# Patient Record
Sex: MALE | Race: BLACK OR AFRICAN AMERICAN | NOT HISPANIC OR LATINO | Employment: UNEMPLOYED | URBAN - METROPOLITAN AREA
[De-identification: names, ages, dates, MRNs, and addresses within clinical notes are randomized per-mention and may not be internally consistent; named-entity substitution may affect disease eponyms.]

---

## 2019-05-03 ENCOUNTER — APPOINTMENT (EMERGENCY)
Dept: RADIOLOGY | Facility: HOSPITAL | Age: 19
End: 2019-05-03
Payer: COMMERCIAL

## 2019-05-03 ENCOUNTER — HOSPITAL ENCOUNTER (EMERGENCY)
Facility: HOSPITAL | Age: 19
Discharge: HOME | End: 2019-05-03
Attending: EMERGENCY MEDICINE | Admitting: EMERGENCY MEDICINE
Payer: COMMERCIAL

## 2019-05-03 VITALS
OXYGEN SATURATION: 98 % | SYSTOLIC BLOOD PRESSURE: 148 MMHG | WEIGHT: 219 LBS | HEART RATE: 102 BPM | DIASTOLIC BLOOD PRESSURE: 97 MMHG | TEMPERATURE: 99.1 F | RESPIRATION RATE: 18 BRPM

## 2019-05-03 DIAGNOSIS — S71.111A LACERATION OF RIGHT THIGH, INITIAL ENCOUNTER: Primary | ICD-10-CM

## 2019-05-03 PROCEDURE — 99283 EMERGENCY DEPT VISIT LOW MDM: CPT

## 2019-05-03 PROCEDURE — 99283 EMERGENCY DEPT VISIT LOW MDM: CPT | Performed by: EMERGENCY MEDICINE

## 2019-05-03 PROCEDURE — 73552 X-RAY EXAM OF FEMUR 2/>: CPT

## 2019-05-03 PROCEDURE — 13121 CMPLX RPR S/A/L 2.6-7.5 CM: CPT | Performed by: EMERGENCY MEDICINE

## 2019-05-03 RX ORDER — LIDOCAINE HYDROCHLORIDE AND EPINEPHRINE 20; 5 MG/ML; UG/ML
10 INJECTION, SOLUTION EPIDURAL; INFILTRATION; INTRACAUDAL; PERINEURAL ONCE
Status: COMPLETED | OUTPATIENT
Start: 2019-05-03 | End: 2019-05-03

## 2019-05-03 RX ORDER — GINSENG 100 MG
1 CAPSULE ORAL ONCE
Status: COMPLETED | OUTPATIENT
Start: 2019-05-03 | End: 2019-05-03

## 2019-05-03 RX ADMIN — LIDOCAINE HYDROCHLORIDE,EPINEPHRINE BITARTRATE 10 ML: 20; .005 INJECTION, SOLUTION EPIDURAL; INFILTRATION; INTRACAUDAL; PERINEURAL at 01:30

## 2019-05-03 RX ADMIN — BACITRACIN ZINC 1 SMALL APPLICATION: 500 OINTMENT TOPICAL at 02:25

## 2019-07-31 ENCOUNTER — APPOINTMENT (EMERGENCY)
Dept: RADIOLOGY | Facility: HOSPITAL | Age: 19
End: 2019-07-31
Payer: COMMERCIAL

## 2019-07-31 ENCOUNTER — HOSPITAL ENCOUNTER (EMERGENCY)
Facility: HOSPITAL | Age: 19
Discharge: HOME | End: 2019-07-31
Attending: EMERGENCY MEDICINE | Admitting: EMERGENCY MEDICINE
Payer: COMMERCIAL

## 2019-07-31 VITALS
DIASTOLIC BLOOD PRESSURE: 89 MMHG | OXYGEN SATURATION: 97 % | SYSTOLIC BLOOD PRESSURE: 150 MMHG | RESPIRATION RATE: 14 BRPM | HEART RATE: 80 BPM | WEIGHT: 214 LBS | TEMPERATURE: 96.8 F

## 2019-07-31 DIAGNOSIS — S43.014A ANTERIOR SHOULDER DISLOCATION, RIGHT, INITIAL ENCOUNTER: Primary | ICD-10-CM

## 2019-07-31 PROCEDURE — 99284 EMERGENCY DEPT VISIT MOD MDM: CPT

## 2019-07-31 PROCEDURE — 23655 CLTX SHO DSLC W/MNPJ W/ANES: CPT | Performed by: EMERGENCY MEDICINE

## 2019-07-31 PROCEDURE — 73030 X-RAY EXAM OF SHOULDER: CPT

## 2019-07-31 PROCEDURE — 99283 EMERGENCY DEPT VISIT LOW MDM: CPT | Performed by: EMERGENCY MEDICINE

## 2019-07-31 PROCEDURE — 96372 THER/PROPH/DIAG INJ SC/IM: CPT

## 2019-07-31 PROCEDURE — 73020 X-RAY EXAM OF SHOULDER: CPT

## 2019-07-31 RX ORDER — PROPOFOL 10 MG/ML
INJECTION, EMULSION INTRAVENOUS
Status: COMPLETED
Start: 2019-07-31 | End: 2019-07-31

## 2019-07-31 RX ORDER — PROPOFOL 10 MG/ML
100 INJECTION, EMULSION INTRAVENOUS ONCE
Status: COMPLETED | OUTPATIENT
Start: 2019-07-31 | End: 2019-07-31

## 2019-07-31 RX ORDER — MIDAZOLAM HYDROCHLORIDE 1 MG/ML
INJECTION INTRAMUSCULAR; INTRAVENOUS
Status: COMPLETED
Start: 2019-07-31 | End: 2019-07-31

## 2019-07-31 RX ORDER — HYDROCODONE BITARTRATE AND IBUPROFEN 7.5; 2 MG/1; MG/1
1 TABLET, FILM COATED ORAL EVERY 6 HOURS PRN
Qty: 10 TABLET | Refills: 0 | Status: SHIPPED | OUTPATIENT
Start: 2019-07-31 | End: 2019-08-10

## 2019-07-31 RX ADMIN — PROPOFOL 100 MG: 10 INJECTION, EMULSION INTRAVENOUS at 05:15

## 2019-07-31 RX ADMIN — MORPHINE SULFATE 2 MG: 2 INJECTION, SOLUTION INTRAMUSCULAR; INTRAVENOUS at 05:18

## 2019-07-31 RX ADMIN — MIDAZOLAM 2 MG: 1 INJECTION INTRAMUSCULAR; INTRAVENOUS at 05:17

## 2019-07-31 RX ADMIN — MORPHINE SULFATE 2 MG: 2 INJECTION, SOLUTION INTRAMUSCULAR; INTRAVENOUS at 05:12

## 2019-07-31 NOTE — ED PROVIDER NOTES
History  Chief Complaint   Patient presents with    Shoulder Pain     right     23 y/o AAM c/o R shoulder pain with possible dislocation  Patient states that he woke up from sleep and attempted to stretch - felt a "pop" and pain ensued  Unable to adduct and internally rotate  History of prior dislocations  No meds taken PTA  Incident occurred around 3:30 per patient  None       History reviewed  No pertinent past medical history  History reviewed  No pertinent surgical history  History reviewed  No pertinent family history  I have reviewed and agree with the history as documented  Social History     Tobacco Use    Smoking status: Current Some Day Smoker    Smokeless tobacco: Never Used   Substance Use Topics    Alcohol use: Never     Frequency: Never    Drug use: Never        Review of Systems   Musculoskeletal: Positive for arthralgias  All other systems reviewed and are negative  Physical Exam  Physical Exam   Constitutional: He is oriented to person, place, and time  He appears well-developed and well-nourished  HENT:   Head: Normocephalic and atraumatic  Nose: Nose normal    Eyes: Pupils are equal, round, and reactive to light  Conjunctivae and EOM are normal    Neck: Normal range of motion  Neck supple  Cardiovascular: Normal rate, regular rhythm and normal heart sounds  Pulmonary/Chest: Effort normal and breath sounds normal    Abdominal: Soft  Bowel sounds are normal    Musculoskeletal: He exhibits tenderness and deformity  Right shoulder: He exhibits decreased range of motion, tenderness, bony tenderness, deformity, pain and decreased strength  He exhibits no swelling, no effusion, no crepitus, no laceration, no spasm and normal pulse  Arms:  Neurological: He is alert and oriented to person, place, and time  Skin: Skin is warm and dry  Capillary refill takes less than 2 seconds  Psychiatric: He has a normal mood and affect     Vitals reviewed  Vital Signs  ED Triage Vitals   Temperature Pulse Respirations Blood Pressure SpO2   07/31/19 0420 07/31/19 0420 07/31/19 0420 07/31/19 0420 07/31/19 0420   (!) 96 8 °F (36 °C) 61 18 (!) 166/110 100 %      Temp src Heart Rate Source Patient Position - Orthostatic VS BP Location FiO2 (%)   -- 07/31/19 0420 07/31/19 0420 07/31/19 0420 --    Monitor Sitting Left arm       Pain Score       07/31/19 0434       Worst Possible Pain           Vitals:    07/31/19 0545 07/31/19 0600 07/31/19 0615 07/31/19 0634   BP: 144/88 148/95 149/86 150/89   Pulse: 70 63 60 80   Patient Position - Orthostatic VS: Lying Lying Lying Lying         Visual Acuity      ED Medications  Medications   propofol (DIPRIVAN) 200 MG/20ML bolus injection 100 mg (100 mg Intravenous Given 7/31/19 0515)   morphine injection 2 mg (2 mg Intramuscular Given 7/31/19 0512)   morphine 2 mg/mL injection **ADS Override Pull** (2 mg  Given 7/31/19 0518)   midazolam (VERSED) 2 mg/2 mL injection **ADS Override Pull** (2 mg  Given 7/31/19 0517)       Diagnostic Studies  Results Reviewed     None                 XR shoulder 1 vw RIGHT POST REDUCTION   Final Result by Nicole Chavira DO (07/31 1730)   FINDINGS/IMPRESSION:   Interval reduction in the previously seen anterior dislocation of the right glenohumeral joint  Alignment of the joint is now anatomic  No fracture is seen  No suspicious appearing osseous lesions  No other significant interval change  Workstation performed: [id number]         XR shoulder 2+ views RIGHT   ED Interpretation by Lakeisha Holt DO (07/31 0440)   R anterior humeral head dislocation  Final Result by Nicole Chavira DO (07/31 0601)   FINDINGS/IMPRESSION:      There is anterior dislocation of the right glenohumeral joint  The bones appear intact  The acromioclavicular joint appears maintained  No significant degenerative changes  No suspicious appearing osseous lesions are seen  The visualized soft tissues appear grossly unremarkable  Workstation performed: [id number]                    Procedures  Orthopedic injury treatment  Date/Time: 7/31/2019 4:56 AM  Performed by: Francisco Javier Kulkarni, DO  Authorized by: Francisco Javier Kulkarni, DO     Patient Location:  ED  Other Assisting Provider: No    Written consent obtained?: Yes    Risks and benefits: Risks, benefits and alternatives were discussed    Consent given by:  Patient  Injury location:  Shoulder  Location details:  Right shoulder  Injury type:  Dislocation  Dislocation type: anterior    Chronicity:  Recurrent  Hill-Sachs deformity?: No    Neurovascular status: Neurovascularly intact    Distal perfusion: normal    Neurological function: normal    Range of motion: reduced    Local anesthesia used?: No    General anesthesia used?: No    Sedation type: Moderate (conscious) sedation (See separate Procedural Sedation form)  Manipulation performed?: No    Immobilization:  Sling  Neurovascular status: Neurovascularly intact    Distal perfusion: normal    Neurological function: normal    Range of motion: improved    Patient tolerance:  Patient tolerated the procedure well with no immediate complications           ED Course   Traction/counter-traction technique utilized to reduce R humeral head dislocation  MDM    Disposition  Final diagnoses:   Anterior shoulder dislocation, right, initial encounter     Time reflects when diagnosis was documented in both MDM as applicable and the Disposition within this note     Time User Action Codes Description Comment    7/31/2019  6:09 AM Sophie Dill Add [[id number]] Anterior shoulder dislocation, right, initial encounter       ED Disposition     ED Disposition Condition Date/Time Comment    Discharge Stable Wed Jul 31, 2019  6:51 AM Rebeca Crew discharge to home/self care              Follow-up Information     Follow up With Specialties Details Why Contact Info Your PCP or Orthopaedic surgeon  Schedule an appointment as soon as possible for a visit in 1 week            Patient's Medications   Discharge Prescriptions    HYDROCODONE-IBUPROFEN (VICOPROFEN) 7 5-200 MG PER TABLET    Take 1 tablet by mouth every 6 (six) hours as needed for moderate pain for up to 10 daysMax Daily Amount: 4 tablets       Start Date: 7/31/2019 End Date: 8/10/2019       Order Dose: 1 tablet       Quantity: 10 tablet    Refills: 0     No discharge procedures on file      ED Provider  Electronically Signed by           Vijaya Styels,   07/31/19 Jamil 33, DO  07/31/19 Jamil 33, DO  07/31/19 5135

## 2019-08-20 ENCOUNTER — APPOINTMENT (EMERGENCY)
Dept: RADIOLOGY | Facility: HOSPITAL | Age: 19
End: 2019-08-20
Payer: COMMERCIAL

## 2019-08-20 ENCOUNTER — HOSPITAL ENCOUNTER (EMERGENCY)
Facility: HOSPITAL | Age: 19
Discharge: HOME | End: 2019-08-20
Attending: EMERGENCY MEDICINE | Admitting: EMERGENCY MEDICINE
Payer: COMMERCIAL

## 2019-08-20 VITALS
WEIGHT: 215.61 LBS | OXYGEN SATURATION: 98 % | DIASTOLIC BLOOD PRESSURE: 91 MMHG | SYSTOLIC BLOOD PRESSURE: 173 MMHG | RESPIRATION RATE: 16 BRPM | TEMPERATURE: 97.8 F | HEART RATE: 87 BPM

## 2019-08-20 DIAGNOSIS — S43.004A DISLOCATION OF RIGHT SHOULDER JOINT, INITIAL ENCOUNTER: Primary | ICD-10-CM

## 2019-08-20 PROCEDURE — 73020 X-RAY EXAM OF SHOULDER: CPT

## 2019-08-20 PROCEDURE — 99284 EMERGENCY DEPT VISIT MOD MDM: CPT

## 2019-08-20 PROCEDURE — 96374 THER/PROPH/DIAG INJ IV PUSH: CPT

## 2019-08-20 PROCEDURE — 73030 X-RAY EXAM OF SHOULDER: CPT

## 2019-08-20 PROCEDURE — 99285 EMERGENCY DEPT VISIT HI MDM: CPT | Performed by: EMERGENCY MEDICINE

## 2019-08-20 PROCEDURE — 96376 TX/PRO/DX INJ SAME DRUG ADON: CPT

## 2019-08-20 PROCEDURE — 23650 CLTX SHO DSLC W/MNPJ WO ANES: CPT | Performed by: EMERGENCY MEDICINE

## 2019-08-20 PROCEDURE — 96375 TX/PRO/DX INJ NEW DRUG ADDON: CPT

## 2019-08-20 RX ORDER — FENTANYL CITRATE 50 UG/ML
100 INJECTION, SOLUTION INTRAMUSCULAR; INTRAVENOUS ONCE
Status: COMPLETED | OUTPATIENT
Start: 2019-08-20 | End: 2019-08-20

## 2019-08-20 RX ORDER — IBUPROFEN 800 MG/1
800 TABLET ORAL 3 TIMES DAILY
Qty: 21 TABLET | Refills: 0 | Status: SHIPPED | OUTPATIENT
Start: 2019-08-20

## 2019-08-20 RX ORDER — KETOROLAC TROMETHAMINE 30 MG/ML
30 INJECTION, SOLUTION INTRAMUSCULAR; INTRAVENOUS ONCE
Status: COMPLETED | OUTPATIENT
Start: 2019-08-20 | End: 2019-08-20

## 2019-08-20 RX ADMIN — FENTANYL CITRATE 100 MCG: 50 INJECTION INTRAMUSCULAR; INTRAVENOUS at 12:19

## 2019-08-20 RX ADMIN — FENTANYL CITRATE 100 MCG: 50 INJECTION INTRAMUSCULAR; INTRAVENOUS at 11:46

## 2019-08-20 RX ADMIN — KETOROLAC TROMETHAMINE 30 MG: 30 INJECTION, SOLUTION INTRAMUSCULAR at 11:45

## 2019-08-20 NOTE — ED NOTES
Assisted Dr Atif Matthews with manipulation of right shoulder, sling applied post-reduction    See provider charting     Moon Vidal RN  08/20/19 4875

## 2020-03-12 ENCOUNTER — APPOINTMENT (EMERGENCY)
Dept: RADIOLOGY | Facility: HOSPITAL | Age: 20
End: 2020-03-12
Payer: COMMERCIAL

## 2020-03-12 ENCOUNTER — HOSPITAL ENCOUNTER (EMERGENCY)
Facility: HOSPITAL | Age: 20
Discharge: HOME | End: 2020-03-12
Attending: EMERGENCY MEDICINE | Admitting: EMERGENCY MEDICINE
Payer: COMMERCIAL

## 2020-03-12 VITALS
DIASTOLIC BLOOD PRESSURE: 86 MMHG | OXYGEN SATURATION: 99 % | WEIGHT: 210 LBS | RESPIRATION RATE: 16 BRPM | HEART RATE: 106 BPM | TEMPERATURE: 96.4 F | SYSTOLIC BLOOD PRESSURE: 155 MMHG

## 2020-03-12 DIAGNOSIS — S41.112A LACERATION OF LEFT UPPER EXTREMITY, INITIAL ENCOUNTER: Primary | ICD-10-CM

## 2020-03-12 PROCEDURE — 12002 RPR S/N/AX/GEN/TRNK2.6-7.5CM: CPT | Performed by: EMERGENCY MEDICINE

## 2020-03-12 PROCEDURE — 99283 EMERGENCY DEPT VISIT LOW MDM: CPT

## 2020-03-12 PROCEDURE — 99284 EMERGENCY DEPT VISIT MOD MDM: CPT | Performed by: EMERGENCY MEDICINE

## 2020-03-12 PROCEDURE — 73080 X-RAY EXAM OF ELBOW: CPT

## 2020-03-12 PROCEDURE — 73090 X-RAY EXAM OF FOREARM: CPT

## 2020-03-12 RX ORDER — GINSENG 100 MG
1 CAPSULE ORAL ONCE
Status: COMPLETED | OUTPATIENT
Start: 2020-03-12 | End: 2020-03-12

## 2020-03-12 RX ORDER — LIDOCAINE HYDROCHLORIDE AND EPINEPHRINE 10; 10 MG/ML; UG/ML
10 INJECTION, SOLUTION INFILTRATION; PERINEURAL ONCE
Status: COMPLETED | OUTPATIENT
Start: 2020-03-12 | End: 2020-03-12

## 2020-03-12 RX ADMIN — LIDOCAINE HYDROCHLORIDE AND EPINEPHRINE 10 ML: 10; 10 INJECTION, SOLUTION INFILTRATION; PERINEURAL at 05:13

## 2020-03-12 RX ADMIN — BACITRACIN ZINC 1 SMALL APPLICATION: 500 OINTMENT TOPICAL at 06:14

## 2020-03-12 NOTE — ED PROVIDER NOTES
History  Chief Complaint   Patient presents with    Extremity Laceration     Laceration to left forearm, patient state's he cut it on the edge of a night stand a metal peice  21 y/o male c/o LUE laceration s/p fall on elbow - cut on metal edge of nightstand table  Tetanus UTD  No gross deformity  FROM  Bleeding controlled with pressure  Prior to Admission Medications   Prescriptions Last Dose Informant Patient Reported? Taking?   ibuprofen (MOTRIN) 800 mg tablet   No No   Sig: Take 1 tablet (800 mg total) by mouth 3 (three) times a day      Facility-Administered Medications: None       Past Medical History:   Diagnosis Date    Dislocation closed, shoulder     started at age 14, right shoulder       History reviewed  No pertinent surgical history  History reviewed  No pertinent family history  I have reviewed and agree with the history as documented  E-Cigarette/Vaping     E-Cigarette/Vaping Substances     Social History     Tobacco Use    Smoking status: Current Some Day Smoker    Smokeless tobacco: Never Used   Substance Use Topics    Alcohol use: Never     Frequency: Never    Drug use: Never       Review of Systems   Skin: Positive for wound  All other systems reviewed and are negative  Physical Exam  Physical Exam   Constitutional: He is oriented to person, place, and time  He appears well-developed and well-nourished  HENT:   Head: Normocephalic and atraumatic  Eyes: Pupils are equal, round, and reactive to light  Conjunctivae and EOM are normal    Neck: Normal range of motion  Neck supple  Cardiovascular: Normal rate and regular rhythm  Pulmonary/Chest: Effort normal and breath sounds normal    Abdominal: Soft  Musculoskeletal: Normal range of motion  He exhibits no tenderness  Neurological: He is alert and oriented to person, place, and time  Skin: Skin is warm  Capillary refill takes less than 2 seconds  Laceration noted          LUE laceration 6 cm in total length - linear   Psychiatric: He has a normal mood and affect  Vitals reviewed  Vital Signs  ED Triage Vitals [03/12/20 0502]   Temperature Pulse Respirations Blood Pressure SpO2   (!) 96 4 °F (35 8 °C) (!) 106 16 155/86 99 %      Temp Source Heart Rate Source Patient Position - Orthostatic VS BP Location FiO2 (%)   Tympanic Monitor Lying Left arm --      Pain Score       --           Vitals:    03/12/20 0502   BP: 155/86   Pulse: (!) 106   Patient Position - Orthostatic VS: Lying         Visual Acuity      ED Medications  Medications   bacitracin topical ointment 1 small application (has no administration in time range)   lidocaine-epinephrine (XYLOCAINE/EPINEPHRINE) 1 %-1:100,000 injection 10 mL (10 mL Infiltration Given 3/12/20 0569)       Diagnostic Studies  Results Reviewed     None                 XR elbow 3+ views LEFT   ED Interpretation by Dane Walton DO (03/12 0010)   Normal 3V L elbow  Final Result by Suze Gibson DO (03/12 4000)      Soft tissue tissue swelling as described but no acute osseous abnormality is seen  No discrete radiopaque foreign body is seen  Workstation performed: [id number]         XR forearm 2 views LEFT   Final Result by Suze Gibson DO (03/12 4213)      Soft tissue tissue swelling as described but no acute osseous abnormality is seen  No discrete radiopaque foreign body is seen        Workstation performed: [id number]                    Procedures  Procedures         ED Course                                 MDM      Disposition  Final diagnoses:   Laceration of left upper extremity, initial encounter     Time reflects when diagnosis was documented in both MDM as applicable and the Disposition within this note     Time User Action Codes Description Comment    3/12/2020  6:08 AM Tirso Valadez Add [[id number]] Laceration of left upper extremity, initial encounter       ED Disposition     ED Disposition Condition Date/Time Comment Discharge Stable Thu Mar 12, 2020  6:07 AM Miguel A Fournier discharge to home/self care  Follow-up Information     Follow up With Specialties Details Why Contact Info    Lisa Eaton DO Family Medicine Schedule an appointment as soon as possible for a visit in 1 week For suture removal, For wound re-check, 7-10 days [address]  [phone]            Patient's Medications   Discharge Prescriptions    No medications on file     No discharge procedures on file      PDMP Review     None          ED Provider  Electronically Signed by           Yamile Marrero DO  03/12/20 8711

## 2022-02-25 ENCOUNTER — HOSPITAL ENCOUNTER (EMERGENCY)
Facility: HOSPITAL | Age: 22
Discharge: HOME | End: 2022-02-25
Attending: EMERGENCY MEDICINE | Admitting: EMERGENCY MEDICINE
Payer: COMMERCIAL

## 2022-02-25 ENCOUNTER — APPOINTMENT (EMERGENCY)
Dept: RADIOLOGY | Facility: HOSPITAL | Age: 22
End: 2022-02-25
Payer: COMMERCIAL

## 2022-02-25 VITALS
WEIGHT: 235.89 LBS | OXYGEN SATURATION: 100 % | HEART RATE: 73 BPM | DIASTOLIC BLOOD PRESSURE: 71 MMHG | RESPIRATION RATE: 18 BRPM | TEMPERATURE: 98.6 F | SYSTOLIC BLOOD PRESSURE: 120 MMHG

## 2022-02-25 DIAGNOSIS — M24.411 SHOULDER DISLOCATION, RECURRENT, RIGHT: Primary | ICD-10-CM

## 2022-02-25 PROCEDURE — 73020 X-RAY EXAM OF SHOULDER: CPT

## 2022-02-25 PROCEDURE — 23650 CLTX SHO DSLC W/MNPJ WO ANES: CPT | Performed by: EMERGENCY MEDICINE

## 2022-02-25 PROCEDURE — 99152 MOD SED SAME PHYS/QHP 5/>YRS: CPT | Performed by: EMERGENCY MEDICINE

## 2022-02-25 PROCEDURE — 99284 EMERGENCY DEPT VISIT MOD MDM: CPT | Performed by: EMERGENCY MEDICINE

## 2022-02-25 PROCEDURE — 99283 EMERGENCY DEPT VISIT LOW MDM: CPT

## 2022-02-25 PROCEDURE — 73030 X-RAY EXAM OF SHOULDER: CPT

## 2022-02-25 RX ORDER — PROPOFOL 10 MG/ML
100 INJECTION, EMULSION INTRAVENOUS ONCE
Status: COMPLETED | OUTPATIENT
Start: 2022-02-25 | End: 2022-02-25

## 2022-02-25 RX ORDER — OXYCODONE HYDROCHLORIDE AND ACETAMINOPHEN 5; 325 MG/1; MG/1
1 TABLET ORAL EVERY 6 HOURS PRN
Qty: 16 TABLET | Refills: 0 | Status: SHIPPED | OUTPATIENT
Start: 2022-02-25 | End: 2022-03-07

## 2022-02-25 RX ADMIN — PROPOFOL 100 MG: 10 INJECTION, EMULSION INTRAVENOUS at 17:16

## 2022-02-25 NOTE — ED PROCEDURE NOTE
PROCEDURE  Orthopedic injury treatment    Date/Time: 2/25/2022 5:29 PM  Performed by: Kitty Christianson MD  Authorized by: Kitty Christianson MD     Patient Location:  ED  Other Assisting Provider: Yes (comment)    Written consent obtained?: Yes    Risks and benefits: Risks, benefits and alternatives were discussed    Consent given by:  Patient  Patient states understanding of procedure being performed: Yes    Radiology Images displayed and confirmed  If images not available, report reviewed: Yes    Time out: Immediately prior to the procedure a time out was called    Injury location:  Shoulder  Injury type:  Dislocation  Dislocation type: anterior    Chronicity:  Recurrent  Hill-Sachs deformity?: No    Neurovascular status: Neurovascularly intact    Distal perfusion: normal    Neurological function: normal    Range of motion: reduced    Local anesthesia used?: No    General anesthesia used?: Yes    Sedation type:   Moderate (conscious) sedation (See separate Procedural Sedation form)  Manipulation performed?: Yes    Reduction method:  Traction and counter traction  Reduction method:  Traction and counter traction  Reduction method:  Traction and counter traction  Reduction method:  Traction and counter traction  Reduction method:  Traction and counter traction  Reduction method:  Traction and counter traction  Skeletal traction used?: No    Reduction successful?: Yes    Confirmation: Reduction confirmed by x-ray    Immobilization:  Sling  Neurovascular status: Neurovascularly intact    Distal perfusion: normal    Neurological function: normal    Range of motion: improved    Patient tolerance:  Patient tolerated the procedure well with no immediate complications         Kitty Christianson MD  02/25/22 7988

## 2022-02-25 NOTE — ED PROCEDURE NOTE
PROCEDURE  Pre-Procedural Sedation  Performed by: Rocky Park MD  Authorized by: Rocky Park MD     Consent:     Consent obtained:  Written    Consent given by:  Patient    Risks discussed:  Inadequate sedation, nausea, vomiting, respiratory compromise necessitating ventilatory assistance and intubation and prolonged hypoxia resulting in organ damage    Alternatives discussed:  Analgesia without sedation  Universal protocol:     Procedure explained and questions answered to patient or proxy's satisfaction: yes      Radiology Images displayed and confirmed    If images not available, report reviewed: yes      Site/side marked: yes      Immediately prior to procedure a time out was called: yes      Patient identity confirmation method:  Verbally with patient and arm band  Indications:     Sedation purpose:  Dislocation reduction    Procedure necessitating sedation performed by:  Physician performing sedation    Intended level of sedation:  Moderate (conscious sedation)  Pre-sedation assessment:     Time since last food or drink:  12 PM    ASA classification: class 1 - normal, healthy patient      Neck mobility: normal      Mouth opening:  3 or more finger widths    Thyromental distance:  2 finger widths    Mallampati score:  I - soft palate, uvula, fauces, pillars visible    Pre-sedation assessments completed and reviewed: airway patency      History of difficult intubation: no      Pre-sedation assessment completed:  2/25/2022 5:12 PM         Rocky Park MD  02/25/22 0249

## 2022-02-25 NOTE — ED PROVIDER NOTES
History  Chief Complaint   Patient presents with    Shoulder Pain     pt reports " throwing child above head and dislocating right shoulder, it's happened before, I guess I need surgery "     Patient is a 68-year-old right hand dominant male who presents with acute onset of sharp nonradiating shoulder pain with limited ROM  Similar to previous dislocations x 4  Was just tossing his child in the air  No direct trauma  No distal numbness/weakness  Last meal at 12 pm   No meds taken  Worse with any attempted movement  Prior to Admission Medications   Prescriptions Last Dose Informant Patient Reported? Taking?   ibuprofen (MOTRIN) 800 mg tablet Not Taking at Unknown time  No No   Sig: Take 1 tablet (800 mg total) by mouth 3 (three) times a day   Patient not taking: Reported on 2/25/2022       Facility-Administered Medications: None       Past Medical History:   Diagnosis Date    Dislocation closed, shoulder     started at age 14, right shoulder       History reviewed  No pertinent surgical history  History reviewed  No pertinent family history  I have reviewed and agree with the history as documented  E-Cigarette/Vaping     E-Cigarette/Vaping Substances     Social History     Tobacco Use    Smoking status: Current Some Day Smoker    Smokeless tobacco: Never Used   Substance Use Topics    Alcohol use: Yes    Drug use: Never       Review of Systems   Constitutional: Negative  HENT: Negative  Eyes: Negative  Respiratory: Negative  Cardiovascular: Negative  Gastrointestinal: Negative  Endocrine: Negative  Genitourinary: Negative  Musculoskeletal: Positive for arthralgias  Skin: Negative  Allergic/Immunologic: Negative  Neurological: Negative  Hematological: Negative  Psychiatric/Behavioral: Negative  All other systems reviewed and are negative  Physical Exam  Physical Exam  Vitals and nursing note reviewed     Constitutional:       Appearance: Normal appearance  He is obese  HENT:      Head: Normocephalic and atraumatic  Cardiovascular:      Rate and Rhythm: Normal rate and regular rhythm  Pulses: Normal pulses  Heart sounds: Normal heart sounds  Pulmonary:      Effort: Pulmonary effort is normal       Breath sounds: Normal breath sounds  Abdominal:      General: Bowel sounds are normal    Musculoskeletal:         General: Deformity present  Cervical back: Normal range of motion and neck supple  Comments: Deformity of lateral right shoulder c/w dislocation  No clavicular or elbow pain  Skin:     General: Skin is warm and dry  Capillary Refill: Capillary refill takes less than 2 seconds  Neurological:      General: No focal deficit present  Mental Status: He is alert and oriented to person, place, and time  Psychiatric:         Mood and Affect: Mood normal          Behavior: Behavior normal          Vital Signs  ED Triage Vitals   Temperature Pulse Respirations Blood Pressure SpO2   02/25/22 1651 02/25/22 1651 02/25/22 1651 02/25/22 1651 02/25/22 1651   98 6 °F (37 °C) 83 18 (!) 151/115 99 %      Temp Source Heart Rate Source Patient Position - Orthostatic VS BP Location FiO2 (%)   02/25/22 1651 02/25/22 1651 02/25/22 1651 02/25/22 1651 --   Tympanic Monitor Sitting Left arm       Pain Score       02/25/22 1728       2           Vitals:    02/25/22 1723 02/25/22 1723 02/25/22 1728 02/25/22 1800   BP: (!) 169/101 (!) 169/101 118/75 120/71   Pulse: 68 68 62 73   Patient Position - Orthostatic VS:  Sitting Lying Sitting         Visual Acuity      ED Medications  Medications   propofol (DIPRIVAN) 200 MG/20ML bolus injection 100 mg (100 mg Intravenous Given by Other 2/25/22 1716)       Diagnostic Studies  Results Reviewed     None                 XR shoulder 1 vw RIGHT POST REDUCTION   Final Result by Edy Madrid MD (02/26 6757)      Interval reduction of a right anterior shoulder dislocation  Workstation performed: [id number]         XR shoulder 2+ views RIGHT   Final Result by Shannan Gabriel MD (02/26 0411)      Right anterior shoulder dislocation  Findings concur with ED results as provided in PACS viewer/EPIC at the time of interpretation  Workstation performed: [id number]                    Procedures  Procedures         ED Course  ED Course as of 02/27/22 1523   Fri Feb 25, 2022   1807 Patient using smarthphone with right hand  No numbness/weakness  Hand strength 5/5  Will dc  Advised ortho follow up  SBIRT 20yo+      Most Recent Value   SBIRT (24 yo +)    In order to provide better care to our patients, we are screening all of our patients for alcohol and drug use  Would it be okay to ask you these screening questions? No Filed at: 02/25/2022 1700                    MDM  Number of Diagnoses or Management Options     Amount and/or Complexity of Data Reviewed  Tests in the radiology section of CPT®: ordered and reviewed  Independent visualization of images, tracings, or specimens: yes        Disposition  Final diagnoses:   Shoulder dislocation, recurrent, right     Time reflects when diagnosis was documented in both MDM as applicable and the Disposition within this note     Time User Action Codes Description Comment    2/25/2022  5:34 PM Yelena Kee Add [[id number]] Shoulder dislocation, recurrent, right       ED Disposition     ED Disposition Condition Date/Time Comment    Discharge Stable Fri Feb 25, 2022  5:59 PM Giuseppe Quispe discharge to home/self care              Follow-up Information     Follow up With Specialties Details Why Contact Info Additional Information    [address] [facility]   [address] [id number]  [address] [place] [address] [facility], [address], [place], [id number] [place] [address] Ln Discharge Medication List as of 2/25/2022  5:59 PM      START taking these medications    Details   oxyCODONE-acetaminophen (Percocet) 5-325 mg per tablet Take 1 tablet by mouth every 6 (six) hours as needed for moderate pain or severe pain for up to 10 days Max Daily Amount: 4 tablets, Starting Fri 2/25/2022, Until Mon 3/7/2022 at 2359, Normal         CONTINUE these medications which have NOT CHANGED    Details   ibuprofen (MOTRIN) 800 mg tablet Take 1 tablet (800 mg total) by mouth 3 (three) times a day, Starting Tue 8/20/2019, Print             No discharge procedures on file      PDMP Review     None          ED Provider  Electronically Signed by           Joaquim Guerra MD  02/25/22 6296       Joaquim Guerra MD  02/27/22 5468

## 2022-02-25 NOTE — ED PROCEDURE NOTE
PROCEDURE  Procedural Sedation    Date/Time: 2/25/2022 5:28 PM  Performed by: Rocky Park MD  Authorized by: Rocky Park MD     Immediate pre-procedure details:     Reassessment: Patient reassessed immediately prior to procedure      Reviewed: vital signs      Verified: bag valve mask available, emergency equipment available, intubation equipment available, IV patency confirmed, oxygen available and suction available    Procedure details (see MAR for exact dosages):     Sedation start time:  2/25/2022 5:16 PM    Preoxygenation:  Nasal cannula    Sedation:  Propofol    Intra-procedure monitoring:  Blood pressure monitoring, continuous capnometry, continuous pulse oximetry, cardiac monitor, frequent vital sign checks and frequent LOC assessments    Intra-procedure events: none      Sedation end time:  2/25/2022 5:28 PM    Total sedation time (minutes):  12         Rocky Park MD  02/25/22 1729

## 2022-03-23 NOTE — ED TRIAGE NOTES
Reports rolling over in his sleep and waking up with right shoulder pain  Patient holding his right arm 
Detail Level: Detailed

## 2023-12-04 ENCOUNTER — HOSPITAL ENCOUNTER (EMERGENCY)
Facility: HOSPITAL | Age: 23
Discharge: HOME | End: 2023-12-04
Attending: EMERGENCY MEDICINE
Payer: COMMERCIAL

## 2023-12-04 VITALS
OXYGEN SATURATION: 98 % | DIASTOLIC BLOOD PRESSURE: 78 MMHG | SYSTOLIC BLOOD PRESSURE: 138 MMHG | RESPIRATION RATE: 20 BRPM | TEMPERATURE: 98.2 F | HEART RATE: 96 BPM

## 2023-12-04 DIAGNOSIS — Z00.8 MEDICAL CLEARANCE FOR INCARCERATION: Primary | ICD-10-CM

## 2023-12-04 PROCEDURE — 99284 EMERGENCY DEPT VISIT MOD MDM: CPT | Performed by: EMERGENCY MEDICINE

## 2023-12-04 PROCEDURE — 99283 EMERGENCY DEPT VISIT LOW MDM: CPT

## 2023-12-04 NOTE — ED PROVIDER NOTES
History  Chief Complaint   Patient presents with    Medical Problem    Psychiatric Evaluation     Pt is in police custody. Pt reported to police that he wanted to kill himself. Pt denied -SI and HI when asked by EMS and when asked during triage. HPI    Anamika Bullard is a 24 y.o. male presenting to us in custody of the police. Per  present, patient had a warrant and was brought into custody. On medical screening questions by police, patient stated that he had suicidal thoughts. Police called EMS for transport to hospital for medical evaluation per their policy. Patient denied any SI/HI to EMS and is currently denying SI/HI. He states that he has never had suicidal thoughts and has never attempted suicide. He states that he only stated he had SI to police so that "they would stop talking to me."     Prior to Admission Medications   Prescriptions Last Dose Informant Patient Reported? Taking?   ibuprofen (MOTRIN) 800 mg tablet   No No   Sig: Take 1 tablet (800 mg total) by mouth 3 (three) times a day   Patient not taking: Reported on 2/25/2022       Facility-Administered Medications: None       Past Medical History:   Diagnosis Date    Dislocation closed, shoulder     started at age 14, right shoulder       History reviewed. No pertinent surgical history. History reviewed. No pertinent family history. I have reviewed and agree with the history as documented.     E-Cigarette/Vaping     E-Cigarette/Vaping Substances     Social History     Tobacco Use    Smoking status: Some Days    Smokeless tobacco: Never   Substance Use Topics    Alcohol use: Yes    Drug use: Never        Review of Systems   Reason unable to perform ROS: Patient refusal.       Physical Exam  ED Triage Vitals [12/04/23 0207]   Temperature Pulse Respirations Blood Pressure SpO2   98.2 °F (36.8 °C) 96 20 138/78 98 %      Temp Source Heart Rate Source Patient Position - Orthostatic VS BP Location FiO2 (%)   Oral Monitor Lying Left arm --      Pain Score       --             Orthostatic Vital Signs  Vitals:    12/04/23 0207   BP: 138/78   Pulse: 96   Patient Position - Orthostatic VS: Lying       Physical Exam  Constitutional:       General: He is not in acute distress. Appearance: Normal appearance. HENT:      Head: Normocephalic and atraumatic. Nose: Nose normal.   Cardiovascular:      Rate and Rhythm: Normal rate. Pulmonary:      Effort: Pulmonary effort is normal.   Abdominal:      General: Abdomen is flat. Musculoskeletal:         General: Normal range of motion. Cervical back: Normal range of motion. Skin:     General: Skin is dry. Neurological:      General: No focal deficit present. Mental Status: He is alert and oriented to person, place, and time. Psychiatric:         Mood and Affect: Mood normal.         Behavior: Behavior normal.         ED Medications  Medications - No data to display    Diagnostic Studies  Results Reviewed       None                   No orders to display         Procedures  Procedures      ED Course                                       Medical Decision Making      Patient is a 24 y.o. male  who presents to the ED in police custody after stating to police that he had suicidal ideation. Patient now denies this. Vital signs stable. Exam as listed above    Differential diagnosis includes but is not limited to - patient denies SI/HI and states he has never attempted suicide and has no current plan to do such. He states that he only claimed SI in order to get the police officers to stop talking to him       Plan to discharge patient as cleared for incarceration.      Disposition  Final diagnoses:   Medical clearance for incarceration     Time reflects when diagnosis was documented in both MDM as applicable and the Disposition within this note       Time User Action Codes Description Comment    12/4/2023  2:34 AM Trice Nelson Add [Z00.8] Medical clearance for incarceration           ED Disposition       ED Disposition   Discharge    Condition   Stable    Date/Time   Mon Dec 4, 2023  2:34 AM    Comment   David Duran discharge to home/self care. Follow-up Information    None         Discharge Medication List as of 12/4/2023  2:34 AM        CONTINUE these medications which have NOT CHANGED    Details   ibuprofen (MOTRIN) 800 mg tablet Take 1 tablet (800 mg total) by mouth 3 (three) times a day, Starting Tue 8/20/2019, Print           No discharge procedures on file. PDMP Review       None             ED Provider  Attending physically available and evaluated David Duran. I managed the patient along with the ED Attending.     Electronically Signed by           Fabio Capellan MD  12/06/23 2105

## 2023-12-05 NOTE — ED ATTENDING ATTESTATION
12/4/2023  I, Nicole Arevalo MD, saw and evaluated the patient. I have discussed the patient with the resident/non-physician practitioner and agree with the resident's/non-physician practitioner's findings, Plan of Care, and MDM as documented in the resident's/non-physician practitioner's note, except where noted. All available labs and Radiology studies were reviewed. I was present for key portions of any procedure(s) performed by the resident/non-physician practitioner and I was immediately available to provide assistance. At this point I agree with the current assessment done in the Emergency Department. I have conducted an independent evaluation of this patient a history and physical is as follows:    71-year-old male brought in by police for evaluation of possible suicidal thoughts. Patient reportedly made suicidal statements to police however he denied these to EMS and to emergency department staff. He continues to deny any SI or HI. He is not actively hallucinating and does not appear to be under the influence of anything. He states that he only said this to police so that they would stop talking to him. On exam, patient was comfortably bed in no acute distress, is normocephalic atraumatic, pupils equal round reactive to light, neck is supple without meningismus signs, heart is regular and rhythm intact distal pulses, no increased work of breathing, respiratory distress, or stridor. Full range of motion of all extremities. Denies any SI or HI. Not actively hallucinating. Currently calm and cooperative. Patient brought in by police for psychiatric evaluation. He is currently denying any SI or HI. He is not actively hallucinating. He is calm and cooperative. No indication for inpatient psychiatric treatment. Patient stable for discharge.     ED Course         Critical Care Time  Procedures